# Patient Record
Sex: FEMALE | Race: WHITE | URBAN - METROPOLITAN AREA
[De-identification: names, ages, dates, MRNs, and addresses within clinical notes are randomized per-mention and may not be internally consistent; named-entity substitution may affect disease eponyms.]

---

## 2017-03-31 ENCOUNTER — THERAPY VISIT (OUTPATIENT)
Dept: PHYSICAL THERAPY | Facility: CLINIC | Age: 67
End: 2017-03-31
Payer: MEDICARE

## 2017-03-31 DIAGNOSIS — M54.2 NECK PAIN: Primary | ICD-10-CM

## 2017-03-31 PROCEDURE — 97112 NEUROMUSCULAR REEDUCATION: CPT | Mod: GP | Performed by: PHYSICAL THERAPIST

## 2017-03-31 PROCEDURE — G8982 BODY POS GOAL STATUS: HCPCS | Mod: GP | Performed by: PHYSICAL THERAPIST

## 2017-03-31 PROCEDURE — 97161 PT EVAL LOW COMPLEX 20 MIN: CPT | Mod: GP | Performed by: PHYSICAL THERAPIST

## 2017-03-31 PROCEDURE — G8981 BODY POS CURRENT STATUS: HCPCS | Mod: GP | Performed by: PHYSICAL THERAPIST

## 2017-03-31 PROCEDURE — 97110 THERAPEUTIC EXERCISES: CPT | Mod: GP | Performed by: PHYSICAL THERAPIST

## 2017-03-31 NOTE — MR AVS SNAPSHOT
After Visit Summary   3/31/2017    Berkley Hutchins    MRN: [id number]           Patient Information     Date Of Birth          1950        Visit Information        Provider Department      3/31/2017 9:00 AM Luiza Ponce, PT [facility]        Today's Diagnoses     Neck pain    -  1       Follow-ups after your visit        Your next 10 appointments already scheduled     Apr 07, 2017  3:30 PM CDT   KASSI Spine with Luiza Ponce PT   [facility] ([facility])    [address]  [facility] [address]   [phone]            Apr 14, 2017 11:00 AM CDT   KASSI Spine with Luiza Ponce PT   [facility] ([facility])    [address]  [facility] [address]   [phone]            Apr 19, 2017 10:10 AM CDT   KASSI Spine with Luiza Ponce PT   [facility] ([facility])    [address]  [facility] [id number] [phone]              Who to contact     If you have questions or need follow up information about today's clinic visit or your schedule please contact [facility] PHYS[facility] THERAP[facility] directly at [phone].  Normal or non-critical lab and imaging results will be communicated to you by MyChart, letter or phone within 4 business days after the clinic has received the results. If you do not hear from us within 7 days, please contact the clinic through MyChart or phone. If you have a critical or abnormal lab result, we will notify you by phone as soon as possible.  Submit refill requests through [facility] or call your pharmacy and they will forward the refill request to us. Please allow 3 business days for your refill to be completed.          Additional Information About Your Visit        MyChart Information      "viblast lets you send messages to your doctor, view your test results, renew your prescriptions, schedule appointments and more. To sign up, go to [email or web address] . Click on \"Log in\" on the left side of the screen, which will take you to the Welcome page. Then click on \"Sign up Now\" on the right side of the page.     You will be asked to enter the access code listed below, as well as some personal information. Please follow the directions to create your username and password.     Your access code is: [email or web address]  Expires: 2017 11:50 AM     Your access code will  in 90 days. If you need help or a new code, please call your [place] clinic or [phone].        Care EveryWhere ID     This is your Care EveryWhere ID. This could be used by other organizations to access your [place] medical records  [id number]         Blood Pressure from Last 3 Encounters:   No data found for BP    Weight from Last 3 Encounters:   No data found for Wt              We Performed the Following     HC PT EVAL, LOW COMPLEXITY     KASSI CERT REPORT     KASSI INITIAL EVAL REPORT     NEUROMUSCULAR RE-EDUCATION     THERAPEUTIC EXERCISES        Primary Care Provider    None Specified       No primary provider on file.        Thank you!     Thank you for choosing [facility]  for your care. Our goal is always to provide you with excellent care. Hearing back from our patients is one way we can continue to improve our services. Please take a few minutes to complete the written survey that you may receive in the mail after your visit with us. Thank you!             Your Updated Medication List - Protect others around you: Learn how to safely use, store and throw away your medicines at www.disposemymeds.org.      Notice  As of 3/31/2017 11:50 AM    You have not been prescribed any medications.      "

## 2017-03-31 NOTE — LETTER
DEPARTMENT OF HEALTH AND HUMAN SERVICES  CENTERS FOR MEDICARE & MEDICAID SERVICES    PLAN/UPDATED PLAN OF PROGRESS FOR OUTPATIENT REHABILITATION    PATIENTS NAME:  Berkley Hutchins     : 1950    PROVIDER NUMBER:    [id number]    [id number]     PROVIDER NAME: [facility]    MEDICAL RECORD NUMBER: [id number]     START OF CARE DATE:  SOC Date: 17   TYPE:  PT    PRIMARY/TREATMENT DIAGNOSIS: (Pertinent Medical Diagnosis)  Neck pain    VISITS FROM START OF CARE:  Rxs Used: 1     [facility] Initial Evaluation    Subjective:  Berkley Hutchins is a 66 year old female with a cervical spine condition.  Condition occurred with:  Insidious onset.  Condition occurred: for unknown reasons.  This is a recurrent and new condition  Berkley saw Dr. Cuello on 3/29/17 with c/o neck pain.  She was diagnosed with cervicalgia and was referred to PT for evaluation and treatment.  Berkley reports that had an episode of neck pain a few years back, better for past few years but started back up with unknown cause a couple of months ago.  Pt reports that she retired last November and she feels that she is more sedentary, ipad/reading. Chief complaints: pain/difficulty sleeping, intermittent HAs (affected by seasonal allergies), pain with turning head to L in car.    Patient reports pain: Cervical left side, upper cervical spine, mid cervical spine and lower cervical spine. Radiates to: top of L shoulder (UT) Pain is described as aching and sharp and is constant and reported as 5/10. Associated symptoms: Loss of motion/stiffness and headache. Pain is worse in the A.M. (depends on day/activity).  Symptoms are exacerbated by rotating head, driving, sitting, certain positions, lying down, carrying, lifting and looking up or down and relieved by heat (self massage).  Since onset symptoms are gradually worsening. Pertinent medical history includes:  High blood  pressure, rheumatoid arthritis and overweight.  Medical allergies: no. Other surgeries include: Other (, D&C, eyes, tonsils). Current medications:  High blood pressure medication. Current occupation is retired. Primary job tasks include:  Prolonged sitting and repetitive tasks (reading). Barriers include:  None as reported by the patient. Red flags:  None as reported by the patient.                      PATIENTS NAME:  Berkley Hutchins   : 1950    Objective:  Standing Alignment:    Cervical/Thoracic:  Forward head  Shoulder/UE:  Rounded shoulders    [facility]ervical Evaluation  Posture:  Sitting: poor  Standing: fair  Protruding Head: yes  Wry Neck: no  Correction of Posture: better  Other Observations: retraction with clinician OP centralized pain to neck  Movement Loss:  Protrusion (PRO): nil  Flexion (Flex): min  Retraction (RET): min  Extension (EXT): mod  Lateral Flexion Right (LF R): major and pain  Lateral Flexion Left (LF L): major and pain  Rotation Right (ROT R): mod  Rotation Left (ROT L): mod and pain  Test Movements:  RET: During: no effect  After: no effect  Mechanical Response: no effect  Repeat RET: During: no effect  After: no effect  Mechanical Response: IncROM  Conclusion: derangement  Principle of Treatment:  Posture Correction: neutral spine with sitting, consistent use of lumbar roll  Other: repeated cervical retraction with self OP every 2-3 hours      Assessment/Plan:    Patient is a 66 year old female with cervical complaints.    Patient has the following significant findings with corresponding treatment plan.                Diagnosis 1:  Neck pain  Pain -  hot/cold therapy, manual therapy, self management, education, directional preference exercise and home program  Decreased ROM/flexibility - manual therapy, therapeutic exercise and home program  Decreased joint mobility - manual therapy, therapeutic exercise and home program  Decreased strength - therapeutic exercise,  therapeutic activities and home program  Impaired muscle performance - neuro re-education and home program  Decreased function - therapeutic activities and home program  Impaired posture - neuro re-education and home program    Therapy Evaluation Codes:   1) History comprised of:   Personal factors that impact the plan of care:      None.    Comorbidity factors that impact the plan of care are:      None.    PATIENTS NAME:  Berkley Hutchins   : 1950     Medications impacting care: None.  2) Examination of Body Systems comprised of:   Body structures and functions that impact the plan of care:      Cervical spine.   Activity limitations that impact the plan of care are:      Driving, Reading/Computer work, Sitting and Sleeping.  3) Clinical presentation characteristics are:   Stable/Uncomplicated.  4) Decision-Making    Low complexity using standardized patient assessment instrument and/or measureable assessment of functional outcome.  Cumulative Therapy Evaluation is: Low complexity.  Previous and current functional limitations:  (See Goal Flow Sheet for this information)    Short term and Long term goals: (See Goal Flow Sheet for this information)   Communication ability:  Patient appears to be able to clearly communicate and understand verbal and written communication and follow directions correctly.  Treatment Explanation - The following has been discussed with the patient:   RX ordered/plan of care  Anticipated outcomes  Possible risks and side effects  This patient would benefit from PT intervention to resume normal activities.   Rehab potential is excellent.  Frequency:  1 X week, once daily  Duration:  for 8 weeks  Discharge Plan:  Achieve all LTG.  Independent in home treatment program.  Reach maximal therapeutic benefit.      Caregiver Signature/Credentials _____________________________ Date ________       Treating Provider: Luiza Ponce DPT   I have reviewed and certified the need for these services  "and plan of treatment while under my care.      PHYSICIAN'S SIGNATURE:   _________________________________________  Date___________   Jayden K Love    Certification period:  Beginning of Cert date period: 03/31/17 to  End of Cert period date: 06/28/17     Functional Level Progress Report: Please see attached \"Goal Flow sheet for Functional level.\"    ____X____ Continue Services or       ________ DC Services                Service dates: From  SOC Date: 03/31/17 date to present                         "

## 2017-03-31 NOTE — PROGRESS NOTES
[facility] Initial Evaluation    Subjective:    Berkley Hutchins is a 66 year old female with a cervical spine condition.  Condition occurred with:  Insidious onset.  Condition occurred: for unknown reasons.  This is a recurrent and new condition  Berkley saw Dr. Cuello on 3/29/17 with c/o neck pain.  She was diagnosed with cervicalgia and was referred to PT for evaluation and treatment.  Berkley reports that had an episode of neck pain a few years back, better for past few years but started back up with unknown cause a couple of months ago.  Pt reports that she retired last November and she feels that she is more sedentary, ipad/reading. Chief complaints: pain/difficulty sleeping, intermittent HAs (affected by seasonal allergies), pain with turning head to L in car.  .    Patient reports pain:  Cervical left side, upper cervical spine, mid cervical spine and lower cervical spine.  Radiates to: top of L shoulder (UT)  Pain is described as aching and sharp and is constant and reported as 5/10.  Associated symptoms:  Loss of motion/stiffness and headache. Pain is worse in the A.M. (depends on day/activity).  Symptoms are exacerbated by rotating head, driving, sitting, certain positions, lying down, carrying, lifting and looking up or down and relieved by heat (self massage).  Since onset symptoms are gradually worsening.          Pertinent medical history includes:  High blood pressure, rheumatoid arthritis and overweight.  Medical allergies: no.  Other surgeries include:  Other (, D&C, eyes, tonsils).  Current medications:  High blood pressure medication.  Current occupation is retired.    Primary job tasks include:  Prolonged sitting and repetitive tasks (reading).    Barriers include:  None as reported by the patient.    Red flags:  None as reported by the patient.                        Objective:    Standing Alignment:    Cervical/Thoracic:  Forward head  Shoulder/UE:  Rounded  shoulders                        Physical Exam    [place] Cervical Evaluation    Posture:  Sitting: poor  Standing: fair  Protruding Head: yes  Wry Neck: no  Correction of Posture: better  Other Observations: retraction with clinician OP centralized pain to neck  Movement Loss:  Protrusion (PRO): nil  Flexion (Flex): min  Retraction (RET): min  Extension (EXT): mod  Lateral Flexion Right (LF R): major and pain  Lateral Flexion Left (LF L): major and pain  Rotation Right (ROT R): mod  Rotation Left (ROT L): mod and pain  Test Movements:      RET: During: no effect  After: no effect  Mechanical Response: no effect  Repeat RET: During: no effect  After: no effect  Mechanical Response: IncROM                          Conclusion: derangement  Principle of Treatment:  Posture Correction: neutral spine with sitting, consistent use of lumbar roll        Other: repeated cervical retraction with self OP every 2-3 hours                                         ROS    Assessment/Plan:      Patient is a 66 year old female with cervical complaints.    Patient has the following significant findings with corresponding treatment plan.                Diagnosis 1:  Neck pain  Pain -  hot/cold therapy, manual therapy, self management, education, directional preference exercise and home program  Decreased ROM/flexibility - manual therapy, therapeutic exercise and home program  Decreased joint mobility - manual therapy, therapeutic exercise and home program  Decreased strength - therapeutic exercise, therapeutic activities and home program  Impaired muscle performance - neuro re-education and home program  Decreased function - therapeutic activities and home program  Impaired posture - neuro re-education and home program    Therapy Evaluation Codes:   1) History comprised of:   Personal factors that impact the plan of care:      None.    Comorbidity factors that impact the plan of care are:      None.     Medications impacting care:  None.  2) Examination of Body Systems comprised of:   Body structures and functions that impact the plan of care:      Cervical spine.   Activity limitations that impact the plan of care are:      Driving, Reading/Computer work, Sitting and Sleeping.  3) Clinical presentation characteristics are:   Stable/Uncomplicated.  4) Decision-Making    Low complexity using standardized patient assessment instrument and/or measureable assessment of functional outcome.  Cumulative Therapy Evaluation is: Low complexity.    Previous and current functional limitations:  (See Goal Flow Sheet for this information)    Short term and Long term goals: (See Goal Flow Sheet for this information)     Communication ability:  Patient appears to be able to clearly communicate and understand verbal and written communication and follow directions correctly.  Treatment Explanation - The following has been discussed with the patient:   RX ordered/plan of care  Anticipated outcomes  Possible risks and side effects  This patient would benefit from PT intervention to resume normal activities.   Rehab potential is excellent.    Frequency:  1 X week, once daily  Duration:  for 8 weeks  Discharge Plan:  Achieve all LTG.  Independent in home treatment program.  Reach maximal therapeutic benefit.    Please refer to the daily flowsheet for treatment today, total treatment time and time spent performing 1:1 timed codes.

## 2017-04-07 ENCOUNTER — THERAPY VISIT (OUTPATIENT)
Dept: PHYSICAL THERAPY | Facility: CLINIC | Age: 67
End: 2017-04-07
Payer: MEDICARE

## 2017-04-07 DIAGNOSIS — M54.2 NECK PAIN: ICD-10-CM

## 2017-04-07 PROCEDURE — 97112 NEUROMUSCULAR REEDUCATION: CPT | Mod: GP | Performed by: PHYSICAL THERAPIST

## 2017-04-07 PROCEDURE — 97110 THERAPEUTIC EXERCISES: CPT | Mod: GP | Performed by: PHYSICAL THERAPIST

## 2017-04-14 ENCOUNTER — THERAPY VISIT (OUTPATIENT)
Dept: PHYSICAL THERAPY | Facility: CLINIC | Age: 67
End: 2017-04-14
Payer: MEDICARE

## 2017-04-14 DIAGNOSIS — M54.2 NECK PAIN: ICD-10-CM

## 2017-04-14 PROCEDURE — 97112 NEUROMUSCULAR REEDUCATION: CPT | Mod: GP | Performed by: PHYSICAL THERAPIST

## 2017-04-14 PROCEDURE — 97110 THERAPEUTIC EXERCISES: CPT | Mod: GP | Performed by: PHYSICAL THERAPIST

## 2017-04-14 PROCEDURE — 97140 MANUAL THERAPY 1/> REGIONS: CPT | Mod: GP | Performed by: PHYSICAL THERAPIST

## 2017-04-19 ENCOUNTER — THERAPY VISIT (OUTPATIENT)
Dept: PHYSICAL THERAPY | Facility: CLINIC | Age: 67
End: 2017-04-19
Payer: MEDICARE

## 2017-04-19 DIAGNOSIS — M54.2 NECK PAIN: ICD-10-CM

## 2017-04-19 PROCEDURE — 97110 THERAPEUTIC EXERCISES: CPT | Mod: GP | Performed by: PHYSICAL THERAPIST

## 2017-04-19 PROCEDURE — 97140 MANUAL THERAPY 1/> REGIONS: CPT | Mod: GP | Performed by: PHYSICAL THERAPIST

## 2017-05-10 ENCOUNTER — THERAPY VISIT (OUTPATIENT)
Dept: PHYSICAL THERAPY | Facility: CLINIC | Age: 67
End: 2017-05-10
Payer: MEDICARE

## 2017-05-10 DIAGNOSIS — M54.2 NECK PAIN: ICD-10-CM

## 2017-05-10 PROCEDURE — 97110 THERAPEUTIC EXERCISES: CPT | Mod: GP | Performed by: PHYSICAL THERAPIST

## 2017-05-16 ENCOUNTER — THERAPY VISIT (OUTPATIENT)
Dept: PHYSICAL THERAPY | Facility: CLINIC | Age: 67
End: 2017-05-16
Payer: MEDICARE

## 2017-05-16 DIAGNOSIS — M54.2 NECK PAIN: ICD-10-CM

## 2017-05-16 PROCEDURE — 97112 NEUROMUSCULAR REEDUCATION: CPT | Mod: GP | Performed by: PHYSICAL THERAPIST

## 2017-05-16 PROCEDURE — 97110 THERAPEUTIC EXERCISES: CPT | Mod: GP | Performed by: PHYSICAL THERAPIST

## 2017-05-16 PROCEDURE — 97140 MANUAL THERAPY 1/> REGIONS: CPT | Mod: GP | Performed by: PHYSICAL THERAPIST

## 2017-05-31 ENCOUNTER — THERAPY VISIT (OUTPATIENT)
Dept: PHYSICAL THERAPY | Facility: CLINIC | Age: 67
End: 2017-05-31
Payer: MEDICARE

## 2017-05-31 DIAGNOSIS — M54.2 NECK PAIN: ICD-10-CM

## 2017-05-31 PROCEDURE — 97530 THERAPEUTIC ACTIVITIES: CPT | Mod: GP | Performed by: PHYSICAL THERAPIST

## 2017-05-31 PROCEDURE — 97110 THERAPEUTIC EXERCISES: CPT | Mod: GP | Performed by: PHYSICAL THERAPIST

## 2017-06-13 ENCOUNTER — THERAPY VISIT (OUTPATIENT)
Dept: PHYSICAL THERAPY | Facility: CLINIC | Age: 67
End: 2017-06-13
Payer: MEDICARE

## 2017-06-13 DIAGNOSIS — M54.2 NECK PAIN: ICD-10-CM

## 2017-06-13 PROCEDURE — 97112 NEUROMUSCULAR REEDUCATION: CPT | Mod: GP | Performed by: PHYSICAL THERAPIST

## 2017-06-13 PROCEDURE — G8982 BODY POS GOAL STATUS: HCPCS | Mod: GP | Performed by: PHYSICAL THERAPIST

## 2017-06-13 PROCEDURE — G8981 BODY POS CURRENT STATUS: HCPCS | Mod: GP | Performed by: PHYSICAL THERAPIST

## 2017-06-13 PROCEDURE — 97140 MANUAL THERAPY 1/> REGIONS: CPT | Mod: GP | Performed by: PHYSICAL THERAPIST

## 2017-06-13 PROCEDURE — 97110 THERAPEUTIC EXERCISES: CPT | Mod: GP | Performed by: PHYSICAL THERAPIST

## 2017-06-13 NOTE — PROGRESS NOTES
Subjective:    HPI                    Objective:    System    Physical Exam    General     ROS    Assessment/Plan:      PROGRESS  REPORT    Progress reporting period is from 3/31/17 to 6/13/17.       SUBJECTIVE  Subjective: Berkley reports that her neck has been feeling ok, noticed that hebert she does less exercise ain increases. Average 2/10 pain and depends on activity. Worst pain in the morning and gets better as the days goes on. Turning head to L always reproduces pain. Feels that she could be better at completing her exercises regularly (often falls off them when travelling).  Would like to continue PT as she notices true improvement when sticking to it.      Current Pain level: 2/10.     Initial Pain level: 5/10.   Changes in function:  Yes (See Goal flowsheet attached for changes in current functional level)  Adverse reaction to treatment or activity: None    OBJECTIVE  Changes noted in objective findings:  Yes, please see below.    Objective: Cervical AROM: flexion wnl + stretch in back, ext with lateral neck pain and mod loss, L SB with min loss, R SB with sig loss, L rotation 50, R rotation 60.  MDT force progression from repeated retraction to retraction with self OP at wall or clinician OP following by retraction/extension decrease and centralize pain to L lateral neck.  We discussed at length that Berkley must alter her ipad ergonomics in order to avoid prolonged cervical flexion (use music stand, pillows or other support).  At this time, Berkley would benefit from continued PT d/t continued variablility of symptoms and pain.  She has shown significant improvement since start of PT and excellent increase in ROM over past 2 weeks, however pain remains a low level.       ASSESSMENT/PLAN  Updated problem list and treatment plan: Diagnosis 1:  Neck pain  Pain -  hot/cold therapy, mechanical traction, manual therapy, self management, education, directional preference exercise and home program  Decreased  ROM/flexibility - manual therapy, therapeutic exercise and home program  Decreased joint mobility - manual therapy, therapeutic exercise and home program  Impaired muscle performance - neuro re-education and home program  Decreased function - therapeutic activities and home program  Impaired posture - neuro re-education  STG/LTGs have been met or progress has been made towards goals:  Yes (See Goal flow sheet completed today.)  Assessment of Progress: The patient's condition is improving.  Patient is meeting short term goals and is progressing towards long term goals.  Self Management Plans:  Patient has been instructed in a home treatment program.  Patient  has been instructed in self management of symptoms.  I have re-evaluated this patient and find that the nature, scope, duration and intensity of the therapy is appropriate for the medical condition of the patient.  Berkley continues to require the following intervention to meet STG and LTG's:  PT    Recommendations:  This patient would benefit from continued therapy.     Frequency:  2 X a month, once daily  Duration:  for 2 months        Please refer to the daily flowsheet for treatment today, total treatment time and time spent performing 1:1 timed codes.

## 2017-06-13 NOTE — LETTER
DEPARTMENT OF HEALTH AND HUMAN SERVICES  CENTERS FOR MEDICARE & MEDICAID SERVICES    PLAN/UPDATED PLAN OF PROGRESS FOR OUTPATIENT REHABILITATION    PATIENTS NAME:  Berkley Hutchins   : 1950  PROVIDER NUMBER:    [id number]  [facility]  [facility]AP[facility]  MEDICAL RECORD NUMBER: [id number]   START OF CARE DATE:  SOC Date: 17   TYPE:  PT  PRIMARY/TREATMENT DIAGNOSIS: (Pertinent Medical Diagnosis)  Neck pain  VISITS FROM START OF CARE:  Rxs Used: 8         PROGRESS  REPORT  Progress reporting period is from 3/31/17 to 17.     SUBJECTIVE  Subjective: Berkley reports that her neck has been feeling ok, noticed that hebert she does less exercise ain increases. Average 2/10 pain and depends on activity. Worst pain in the morning and gets better as the days goes on. Turning head to L always reproduces pain. Feels that she could be better at completing her exercises regularly (often falls off them when travelling).  Would like to continue PT as she notices true improvement when sticking to it.      Current Pain level: 2/10.     Initial Pain level: 5/10.   Changes in function:  Yes (See Goal flowsheet attached for changes in current functional level)  Adverse reaction to treatment or activity: None  OBJECTIVE  Changes noted in objective findings:  Yes, please see below.    Objective: Cervical AROM: flexion wnl + stretch in back, ext with lateral neck pain and mod loss, L SB with min loss, R SB with sig loss, L rotation 50, R rotation 60.  MDT force progression from repeated retraction to retraction with self OP at wall or clinician OP following by retraction/extension decrease and centralize pain to L lateral neck.  We discussed at length that Berkley must alter her ipad ergonomics in order to avoid prolonged cervical flexion (use music stand, pillows or other support).  At this time, Berkley would benefit from continued PT d/t continued variablility  of symptoms and pain.  She has shown significant improvement since start of PT and excellent increase in ROM over past 2 weeks, however pain remains a low level.     ASSESSMENT/PLAN  Updated problem list and treatment plan: Diagnosis 1:  Neck pain  Pain -  hot/cold therapy, mechanical traction, manual therapy, self management, education, directional preference exercise and home program  Decreased ROM/flexibility - manual therapy, therapeutic exercise and home program  Decreased joint mobility - manual therapy, therapeutic exercise and home program  Impaired muscle performance - neuro re-education and home program  Decreased function - therapeutic activities and home program  Impaired posture - neuro re-education  STG/LTGs have been met or progress has been made towards goals:  Yes (See Goal flow sheet completed today.)  Assessment of Progress: The patient's condition is improving.  Patient is meeting short term goals and is progressing towards long term goals.  Self Management Plans:  Patient has been instructed in a home treatment program.  Patient  has been instructed in self management of symptoms.  I have re-evaluated this patient and find that the nature, scope, duration and intensity of the therapy is appropriate for the medical condition of the patient.  Berkley continues to require the following intervention to meet STG and LTG's:  PT    Recommendations:  This patient would benefit from continued therapy.     Frequency:  2 X a month, once daily  Duration:  for 2 months      Caregiver Signature/Credentials _____________________________ Date ________       Treating Provider: Luiza Ponce DPT   I have reviewed and certified the need for these services and plan of treatment while under my care.        PHYSICIAN'S SIGNATURE:   _________________________________________  Date___________   Jayden Cuello    Certification period:  Beginning of Cert date period: 06/13/17 to  End of Cert period date: 09/10/17     Functional  "Level Progress Report: Please see attached \"Goal Flow sheet for Functional level.\"    ____X____ Continue Services or       ________ DC Services                Service dates: From  SOC Date: 03/31/17 date to present                         "

## 2017-06-13 NOTE — MR AVS SNAPSHOT
"              After Visit Summary   6/13/2017    Berkley Hutchins    MRN: [id number]           Patient Information     Date Of Birth          1950        Visit Information        Provider Department      6/13/2017 2:10 PM Luiza Ponce, PT [facility]        Today's Diagnoses     Neck pain           Follow-ups after your visit        Your next 10 appointments already scheduled     Jun 27, 2017  9:40 AM CDT   KASSI Spine with Luiza Ponce PT   [facility] ([facility])    [address]  [facility] [id number] [phone]            Jul 11, 2017 10:20 AM CDT   KASSI Spine with Luiza Ponce PT   [facility] ([facility])    [address]  [facility] [id number] [phone]              Who to contact     If you have questions or need follow up information about today's clinic visit or your schedule please contact [facility] directly at [phone].  Normal or non-critical lab and imaging results will be communicated to you by Videofropperhart, letter or phone within 4 business days after the clinic has received the results. If you do not hear from us within 7 days, please contact the clinic through [email or web address] or phone. If you have a critical or abnormal lab result, we will notify you by phone as soon as possible.  Submit refill requests through MiracleCord or call your pharmacy and they will forward the refill request to us. Please allow 3 business days for your refill to be completed.          Additional Information About Your Visit        MyChart Information     MiracleCord lets you send messages to your doctor, view your test results, renew your prescriptions, schedule appointments and more. To sign up, go to [email or web address] . Click on \"Log in\" on the left side of the screen, which " "will take you to the Welcome page. Then click on \"Sign up Now\" on the right side of the page.     You will be asked to enter the access code listed below, as well as some personal information. Please follow the directions to create your username and password.     Your access code is: [email or web address]  Expires: 2017 11:50 AM     Your access code will  in 90 days. If you need help or a new code, please call your [place] clinic or [phone].        Care EveryWhere ID     This is your Care EveryWhere ID. This could be used by other organizations to access your [place] medical records  [id number]         Blood Pressure from Last 3 Encounters:   No data found for BP    Weight from Last 3 Encounters:   No data found for Wt              We Performed the Following     KASSI PROGRESS NOTES REPORT     KASSI RE-CERT REPORT     MANUAL THER TECH,1+REGIONS,EA 15 MIN     NEUROMUSCULAR RE-EDUCATION     THERAPEUTIC EXERCISES        Primary Care Provider    None Specified       No primary provider on file.        Thank you!     Thank you for choosing [place] [facility] PHYS[facility] THERAP[facility]  for your care. Our goal is always to provide you with excellent care. Hearing back from our patients is one way we can continue to improve our services. Please take a few minutes to complete the written survey that you may receive in the mail after your visit with us. Thank you!             Your Updated Medication List - Protect others around you: Learn how to safely use, store and throw away your medicines at www.disposemymeds.org.      Notice  As of 2017  3:29 PM    You have not been prescribed any medications.      "

## 2017-06-13 NOTE — LETTER
[facility] PHYSICAL THERAPY  [address]  [place] [id number] [phone]    2017    Re: Berkley Hutchins   :   1950  MRN:  [id number]   REFERRING PHYSICIAN:   Jayden Cuello    [facility] PHYSICAL [facility]    Date of Initial Evaluation:  2017  Visits:  Rxs Used: 8  Reason for Referral:  Neck pain    EVALUATION SUMMARY      PROGRESS  REPORT    Progress reporting period is from 3/31/17 to 17.       SUBJECTIVE  Subjective: Berkley reports that her neck has been feeling ok, noticed that hebert she does less exercise ain increases. Average 2/10 pain and depends on activity. Worst pain in the morning and gets better as the days goes on. Turning head to L always reproduces pain. Feels that she could be better at completing her exercises regularly (often falls off them when travelling).  Would like to continue PT as she notices true improvement when sticking to it. Current Pain level: 2/10. Initial Pain level: 5/10. Changes in function:  Yes (See Goal flowsheet attached for changes in current functional level)  Adverse reaction to treatment or activity: None    OBJECTIVE  Changes noted in objective findings:  Yes, please see below.    Objective: Cervical AROM: flexion wnl + stretch in back, ext with lateral neck pain and mod loss, L SB with min loss, R SB with sig loss, L rotation 50, R rotation 60.  MDT force progression from repeated retraction to retraction with self OP at wall or clinician OP following by retraction/extension decrease and centralize pain to L lateral neck.  We discussed at length that Berkley must alter her ipad ergonomics in order to avoid prolonged cervical flexion (use music stand, pillows or other support).  At this time, Berkley would benefit from continued PT d/t continued variablility of symptoms and pain.  She has shown significant improvement since start of PT and excellent increase in  ROM over past 2 weeks, however pain remains a low level.       ASSESSMENT/PLAN  Updated problem list and treatment plan: Diagnosis 1:  Neck pain  Pain -  hot/cold therapy, mechanical traction, manual therapy, self management, education, directional    Re: Berkley Hutchins   :   1950  Page:  2       preference exercise and home program  Decreased ROM/flexibility - manual therapy, therapeutic exercise and home program  Decreased joint mobility - manual therapy, therapeutic exercise and home program  Impaired muscle performance - neuro re-education and home program  Decreased function - therapeutic activities and home program  Impaired posture - neuro re-education  STG/LTGs have been met or progress has been made towards goals:  Yes (See Goal flow sheet completed today.)  Assessment of Progress: The patient's condition is improving.  Patient is meeting short term goals and is progressing towards long term goals.  Self Management Plans:  Patient has been instructed in a home treatment program.  Patient  has been instructed in self management of symptoms.  I have re-evaluated this patient and find that the nature, scope, duration and intensity of the therapy is appropriate for the medical condition of the patient.  Berkley continues to require the following intervention to meet STG and LTG's:  PT    Recommendations:  This patient would benefit from continued therapy.     Frequency:  2 X a month, once daily  Duration:  for 2 months      Thank you for your referral.    INQUIRIES  Therapist: Luiza Ponce, PT  [facility]ER[facility]  [address] [place] [address]. [address]  [facility] [address]  Phone: [phone]  Fax: [phone]

## 2017-07-11 ENCOUNTER — THERAPY VISIT (OUTPATIENT)
Dept: PHYSICAL THERAPY | Facility: CLINIC | Age: 67
End: 2017-07-11
Payer: MEDICARE

## 2017-07-11 DIAGNOSIS — M54.2 NECK PAIN: ICD-10-CM

## 2017-07-11 PROCEDURE — 97140 MANUAL THERAPY 1/> REGIONS: CPT | Mod: GP | Performed by: PHYSICAL THERAPIST

## 2017-07-11 PROCEDURE — 97110 THERAPEUTIC EXERCISES: CPT | Mod: GP | Performed by: PHYSICAL THERAPIST

## 2017-08-01 ENCOUNTER — THERAPY VISIT (OUTPATIENT)
Dept: PHYSICAL THERAPY | Facility: CLINIC | Age: 67
End: 2017-08-01
Payer: MEDICARE

## 2017-08-01 DIAGNOSIS — M54.2 NECK PAIN: ICD-10-CM

## 2017-08-01 PROCEDURE — 97140 MANUAL THERAPY 1/> REGIONS: CPT | Mod: GP | Performed by: PHYSICAL THERAPIST

## 2017-08-01 PROCEDURE — 97110 THERAPEUTIC EXERCISES: CPT | Mod: GP | Performed by: PHYSICAL THERAPIST

## 2017-09-05 ENCOUNTER — THERAPY VISIT (OUTPATIENT)
Dept: PHYSICAL THERAPY | Facility: CLINIC | Age: 67
End: 2017-09-05
Payer: MEDICARE

## 2017-09-05 DIAGNOSIS — M54.2 NECK PAIN: ICD-10-CM

## 2017-09-05 PROCEDURE — 97112 NEUROMUSCULAR REEDUCATION: CPT | Mod: GP | Performed by: PHYSICAL THERAPIST

## 2017-09-05 PROCEDURE — 97110 THERAPEUTIC EXERCISES: CPT | Mod: GP | Performed by: PHYSICAL THERAPIST

## 2017-09-05 NOTE — LETTER
[facility] ATHLETIC [facility] PHYSICAL THERAPY  [address]  [place] [id number] [phone]    2017    Re: Berkley Hutchins   :   1950  MRN:  [id number]   REFERRING PHYSICIAN:   Jayden Cuello    [facility]TH[facility]IC [facility] PHYSICAL [facility]    Date of Initial Evaluation:  2017  Visits:  Rxs Used: 11  Reason for Referral:  Neck pain    DISCHARGE REPORT    Progress reporting period is from 17 to 17.       SUBJECTIVE  Subjective: Berkley reports that the past month has gone well with only slight increase in pain [place].  Not much pain with driving, No trouble sleeping as long as she has right pillow arrangement.  Doing her exercises as needed on avg 3x/day but not every day. Feels that she is ready to progress on her own after today.      Current Pain level: 1/10.     Initial Pain level: 5/10.   Changes in function:  Yes, please see above.   Adverse reaction to treatment or activity: None    OBJECTIVE  Changes noted in objective findings:  Yes, please see below.   Objective: Cervical AROM: flex wnl and painfree, ext with mod loss and painfree, B SB with mod loss and opp stretch in UT, R rot 70 with ERP only, L rot 65 and ERP.  5/5 B UE myotome strength C1-C7.  Pain only in lateral neck, no longer in top of shoulder.  Initial increase in pain with repeated retraction with self OP, however reduced with 2nd set.  Progression to retraction/ext centralizaed pain to focal area on L lateral neck in upper cervical region.  Further reduction in pain (trace remaining) following ret with clinician OP.  Emphasis given to consistency with posture of scap ret/depress and cervical retraction, use of lumbar roll.  Berkley has been instructed to continue her exercises on a regular basis (daily) and has been instructed to return to PT should she experience an exacerbation in pain or should her progress  ean.  She has 1 visit remaining on her original order but will progress independently after today as able.       ASSESSMENT/PLAN  Updated problem list and treatment plan: Diagnosis 1:  Neck pain  Pain -  home program  Decreased ROM/flexibility - home program  Impaired muscle performance - home program  Impaired posture - home program  STG/LTGs have been met or progress has been made towards goals:  Yes (See Goal flow sheet completed today.)  Assessment of Progress: The patient's condition is improving.  The patient has met all of their long term goals.  Self Management Plans:  Patient has been instructed in a home treatment program.  Patient  has been instructed in self management of symptoms.  I have re-evaluated this patient and find that the nature, scope, duration and intensity of the therapy is appropriate for the medical condition of the patient.  Berkley continues to require the following intervention to meet STG and LTG's:  PT intervention is no longer required to meet STG/LTG.    Recommendations:  This patient is ready to be discharged from therapy and continue their home treatment program.    Thank you for your referral.    INQUIRIES  Therapist: Luiza Ponce, PT    [facility] PHYSICAL THERAPY  [address] [place] [address]. [address]  [facility] [address]  Phone: [phone]  Fax: [phone]

## 2017-09-05 NOTE — PROGRESS NOTES
Subjective:    HPI                    Objective:    System    Physical Exam    General     ROS    Assessment/Plan:      DISCHARGE REPORT    Progress reporting period is from 6/13/17 to 9/5/17.       SUBJECTIVE  Subjective: Berkley reports that the past month has gone well with only slight increase in pain [place].  Not much pain with driving, No trouble sleeping as long as she has right pillow arrangement.  Doing her exercises as needed on avg 3x/day but not every day. Feels that she is ready to progress on her own after today.      Current Pain level: 1/10.     Initial Pain level: 5/10.   Changes in function:  Yes, please see above.   Adverse reaction to treatment or activity: None    OBJECTIVE  Changes noted in objective findings:  Yes, please see below.   Objective: Cervical AROM: flex wnl and painfree, ext with mod loss and painfree, B SB with mod loss and opp stretch in UT, R rot 70 with ERP only, L rot 65 and ERP.  5/5 B UE myotome strength C1-C7.  Pain only in lateral neck, no longer in top of shoulder.  Initial increase in pain with repeated retraction with self OP, however reduced with 2nd set.  Progression to retraction/ext centralizaed pain to focal area on L lateral neck in upper cervical region.  Further reduction in pain (trace remaining) following ret with clinician OP.  Emphasis given to consistency with posture of scap ret/depress and cervical retraction, use of lumbar roll.  Berkley has been instructed to continue her exercises on a regular basis (daily) and has been instructed to return to PT should she experience an exacerbation in pain or should her progress plateau.  She has 1 visit remaining on her original order but will progress independently after today as able.       ASSESSMENT/PLAN  Updated problem list and treatment plan: Diagnosis 1:  Neck pain  Pain -  home program  Decreased ROM/flexibility - home program  Impaired muscle performance - home  program  Impaired posture - home program  STG/LTGs have been met or progress has been made towards goals:  Yes (See Goal flow sheet completed today.)  Assessment of Progress: The patient's condition is improving.  The patient has met all of their long term goals.  Self Management Plans:  Patient has been instructed in a home treatment program.  Patient  has been instructed in self management of symptoms.  I have re-evaluated this patient and find that the nature, scope, duration and intensity of the therapy is appropriate for the medical condition of the patient.  Berkley continues to require the following intervention to meet STG and LTG's:  PT intervention is no longer required to meet STG/LTG.    Recommendations:  This patient is ready to be discharged from therapy and continue their home treatment program.    Please refer to the daily flowsheet for treatment today, total treatment time and time spent performing 1:1 timed codes.

## 2017-09-05 NOTE — MR AVS SNAPSHOT
"              After Visit Summary   2017    Berkley Hutchins    MRN: [id number]           Patient Information     Date Of Birth          1950        Visit Information        Provider Department      2017 10:20 AM Luiza Ponce PT [facility]        Today's Diagnoses     Neck pain           Follow-ups after your visit        Who to contact     If you have questions or need follow up information about today's clinic visit or your schedule please contact [facility] ATHLETIC [facility] PHYSICAL [facility] directly at [phone].  Normal or non-critical lab and imaging results will be communicated to you by Aconexhart, letter or phone within 4 business days after the clinic has received the results. If you do not hear from us within 7 days, please contact the clinic through [employer] or phone. If you have a critical or abnormal lab result, we will notify you by phone as soon as possible.  Submit refill requests through deskwolf or call your pharmacy and they will forward the refill request to us. Please allow 3 business days for your refill to be completed.          Additional Information About Your Visit        MyChart Information     deskwolf lets you send messages to your doctor, view your test results, renew your prescriptions, schedule appointments and more. To sign up, go to [email or web address] . Click on \"Log in\" on the left side of the screen, which will take you to the Welcome page. Then click on \"Sign up Now\" on the right side of the page.     You will be asked to enter the access code listed below, as well as some personal information. Please follow the directions to create your username and password.     Your access code is: [email or web address]  Expires: 10/9/2017 12:56 PM     Your access code will  in 90 days. If you need help or a new code, please call your [place] clinic or [phone].        Care EveryWhere ID     This is your " Care EveryWhere ID. This could be used by other organizations to access your [place] medical records  [id number]         Blood Pressure from Last 3 Encounters:   No data found for BP    Weight from Last 3 Encounters:   No data found for Wt              We Performed the Following     KASSI PROGRESS NOTES REPORT     NEUROMUSCULAR RE-EDUCATION     THERAPEUTIC EXERCISES        Primary Care Provider    None Specified       No primary provider on file.        Equal Access to Services     [facility]: Hadii aad ku hadshawandao Soomaali, waaxda luqadaha, qaybta kaalmada leo, jazmine shannondamionana laura funez . So [facility] [phone].    ATENCIÓN: Si habla español, tiene a garcia disposición servicios gratuitos de asistencia lingüística. Llame al [phone].    We comply with applicable federal civil rights laws and [place] laws. We do not discriminate on the basis of race, color, national origin, age, disability sex, sexual orientation or gender identity.            Thank you!     Thank you for choosing [place] [facility] PHYSICAL THERAPY  for your care. Our goal is always to provide you with excellent care. Hearing back from our patients is one way we can continue to improve our services. Please take a few minutes to complete the written survey that you may receive in the mail after your visit with us. Thank you!             Your Updated Medication List - Protect others around you: Learn how to safely use, store and throw away your medicines at www.disposemymeds.org.      Notice  As of 9/5/2017 10:56 AM    You have not been prescribed any medications.

## 2019-12-11 ENCOUNTER — APPOINTMENT (OUTPATIENT)
Age: 69
Setting detail: DERMATOLOGY
End: 2019-12-11

## 2019-12-11 DIAGNOSIS — L57.0 ACTINIC KERATOSIS: ICD-10-CM

## 2019-12-11 DIAGNOSIS — L57.8 OTHER SKIN CHANGES DUE TO CHRONIC EXPOSURE TO NONIONIZING RADIATION: ICD-10-CM

## 2019-12-11 DIAGNOSIS — D22 MELANOCYTIC NEVI: ICD-10-CM

## 2019-12-11 DIAGNOSIS — L91.8 OTHER HYPERTROPHIC DISORDERS OF THE SKIN: ICD-10-CM

## 2019-12-11 DIAGNOSIS — L82.1 OTHER SEBORRHEIC KERATOSIS: ICD-10-CM

## 2019-12-11 DIAGNOSIS — L82.0 INFLAMED SEBORRHEIC KERATOSIS: ICD-10-CM

## 2019-12-11 PROBLEM — D22.5 MELANOCYTIC NEVI OF TRUNK: Status: ACTIVE | Noted: 2019-12-11

## 2019-12-11 PROCEDURE — OTHER SKIN TAG REMOVAL MULTI: OTHER

## 2019-12-11 PROCEDURE — OTHER COUNSELING: OTHER

## 2019-12-11 PROCEDURE — 11200 RMVL SKIN TAGS UP TO&INC 15: CPT | Mod: 59

## 2019-12-11 PROCEDURE — OTHER LIQUID NITROGEN: OTHER

## 2019-12-11 PROCEDURE — 99214 OFFICE O/P EST MOD 30 MIN: CPT | Mod: 25

## 2019-12-11 PROCEDURE — 17110 DESTRUCT B9 LESION 1-14: CPT

## 2019-12-11 PROCEDURE — 17000 DESTRUCT PREMALG LESION: CPT | Mod: 59

## 2019-12-11 ASSESSMENT — LOCATION DETAILED DESCRIPTION DERM
LOCATION DETAILED: LEFT INFERIOR LATERAL NECK
LOCATION DETAILED: INFERIOR THORACIC SPINE
LOCATION DETAILED: RIGHT INFERIOR CENTRAL MALAR CHEEK
LOCATION DETAILED: RIGHT LATERAL SUPERIOR CHEST
LOCATION DETAILED: RIGHT INFERIOR MEDIAL UPPER BACK
LOCATION DETAILED: LEFT PROXIMAL LATERAL CALF
LOCATION DETAILED: RIGHT MEDIAL UPPER BACK

## 2019-12-11 ASSESSMENT — LOCATION ZONE DERM
LOCATION ZONE: FACE
LOCATION ZONE: TRUNK
LOCATION ZONE: LEG
LOCATION ZONE: NECK

## 2019-12-11 ASSESSMENT — LOCATION SIMPLE DESCRIPTION DERM
LOCATION SIMPLE: RIGHT UPPER BACK
LOCATION SIMPLE: LEFT CALF
LOCATION SIMPLE: LEFT ANTERIOR NECK
LOCATION SIMPLE: CHEST
LOCATION SIMPLE: UPPER BACK
LOCATION SIMPLE: RIGHT CHEEK

## 2019-12-11 NOTE — PROCEDURE: SKIN TAG REMOVAL MULTI
Consent: Written consent obtained and the risks of skin tag removal was reviewed with the patient including but not limited to bleeding, pigmentary change, infection, pain, and remote possibility of scarring.
Include Z78.9 (Other Specified Conditions Influencing Health Status) As An Associated Diagnosis?: No
Medical Necessity Information: It is in your best interest to select a reason for this procedure from the list below. All of these items fulfill various CMS LCD requirements except the new and changing color options.
Detail Level: Detailed
Medical Necessity Clause: This procedure was medically necessary because the lesions that were treated were:
Total Number Of Lesions Treated: 1
Add Associated Diagnoses If Applicable When Selecting Medical Necessity: Yes

## 2019-12-11 NOTE — PROCEDURE: LIQUID NITROGEN
Number Of Freeze-Thaw Cycles: 1 freeze-thaw cycle
Render Note In Bullet Format When Appropriate: Yes
Add 52 Modifier (Optional): no
Detail Level: Simple
Post-Care Instructions: I reviewed with the patient in detail post-care instructions. Patient is to wear sunprotection, and avoid picking at any of the treated lesions. Pt may apply Vaseline to crusted or scabbing areas.
Consent: The patient's consent was obtained including but not limited to risks of crusting, scabbing, blistering, scarring, darker or lighter pigmentary change, recurrence, incomplete removal and infection.
Medical Necessity Information: It is in your best interest to select a reason for this procedure from the list below. All of these items fulfill various CMS LCD requirements except the new and changing color options.
Medical Necessity Clause: This procedure was medically necessary because the lesions that were treated were:
Duration Of Freeze Thaw-Cycle (Seconds): 5
Duration Of Freeze Thaw-Cycle (Seconds): 5-10

## 2020-12-30 ENCOUNTER — APPOINTMENT (OUTPATIENT)
Dept: URBAN - METROPOLITAN AREA CLINIC 259 | Age: 70
Setting detail: DERMATOLOGY
End: 2020-12-30

## 2020-12-30 DIAGNOSIS — L81.4 OTHER MELANIN HYPERPIGMENTATION: ICD-10-CM

## 2020-12-30 DIAGNOSIS — L57.8 OTHER SKIN CHANGES DUE TO CHRONIC EXPOSURE TO NONIONIZING RADIATION: ICD-10-CM

## 2020-12-30 DIAGNOSIS — D18.0 HEMANGIOMA: ICD-10-CM

## 2020-12-30 DIAGNOSIS — D22 MELANOCYTIC NEVI: ICD-10-CM

## 2020-12-30 DIAGNOSIS — L82.1 OTHER SEBORRHEIC KERATOSIS: ICD-10-CM

## 2020-12-30 DIAGNOSIS — L57.0 ACTINIC KERATOSIS: ICD-10-CM

## 2020-12-30 DIAGNOSIS — Z71.89 OTHER SPECIFIED COUNSELING: ICD-10-CM

## 2020-12-30 PROBLEM — D22.5 MELANOCYTIC NEVI OF TRUNK: Status: ACTIVE | Noted: 2020-12-30

## 2020-12-30 PROBLEM — D18.01 HEMANGIOMA OF SKIN AND SUBCUTANEOUS TISSUE: Status: ACTIVE | Noted: 2020-12-30

## 2020-12-30 PROCEDURE — OTHER COUNSELING: OTHER

## 2020-12-30 PROCEDURE — OTHER LIQUID NITROGEN: OTHER

## 2020-12-30 PROCEDURE — 99214 OFFICE O/P EST MOD 30 MIN: CPT | Mod: 25

## 2020-12-30 PROCEDURE — 17000 DESTRUCT PREMALG LESION: CPT

## 2020-12-30 ASSESSMENT — LOCATION SIMPLE DESCRIPTION DERM
LOCATION SIMPLE: LEFT UPPER BACK
LOCATION SIMPLE: RIGHT CHEEK
LOCATION SIMPLE: UPPER BACK
LOCATION SIMPLE: RIGHT UPPER BACK

## 2020-12-30 ASSESSMENT — LOCATION DETAILED DESCRIPTION DERM
LOCATION DETAILED: INFERIOR THORACIC SPINE
LOCATION DETAILED: LEFT INFERIOR MEDIAL UPPER BACK
LOCATION DETAILED: RIGHT SUPERIOR MEDIAL UPPER BACK
LOCATION DETAILED: LEFT MEDIAL UPPER BACK
LOCATION DETAILED: RIGHT INFERIOR CENTRAL MALAR CHEEK

## 2020-12-30 ASSESSMENT — LOCATION ZONE DERM
LOCATION ZONE: FACE
LOCATION ZONE: TRUNK

## 2020-12-30 NOTE — PROCEDURE: LIQUID NITROGEN
Number Of Freeze-Thaw Cycles: 1 freeze-thaw cycle
Duration Of Freeze Thaw-Cycle (Seconds): 5
Detail Level: Detailed
Post-Care Instructions: I reviewed with the patient in detail post-care instructions. Patient is to wear sunprotection, and avoid picking at any of the treated lesions. Pt may apply Vaseline to crusted or scabbing areas.
Render Note In Bullet Format When Appropriate: Yes
Consent: The patient's consent was obtained including but not limited to risks of crusting, scabbing, blistering, scarring, darker or lighter pigmentary change, recurrence, incomplete removal and infection.

## 2022-02-17 ENCOUNTER — APPOINTMENT (OUTPATIENT)
Dept: URBAN - METROPOLITAN AREA CLINIC 259 | Age: 72
Setting detail: DERMATOLOGY
End: 2022-02-17

## 2022-02-17 VITALS — RESPIRATION RATE: 16 BRPM | HEIGHT: 63 IN | WEIGHT: 179 LBS

## 2022-02-17 DIAGNOSIS — L57.8 OTHER SKIN CHANGES DUE TO CHRONIC EXPOSURE TO NONIONIZING RADIATION: ICD-10-CM

## 2022-02-17 DIAGNOSIS — D22 MELANOCYTIC NEVI: ICD-10-CM

## 2022-02-17 DIAGNOSIS — L40.0 PSORIASIS VULGARIS: ICD-10-CM

## 2022-02-17 DIAGNOSIS — L30.4 ERYTHEMA INTERTRIGO: ICD-10-CM

## 2022-02-17 DIAGNOSIS — Z71.89 OTHER SPECIFIED COUNSELING: ICD-10-CM

## 2022-02-17 DIAGNOSIS — L57.0 ACTINIC KERATOSIS: ICD-10-CM

## 2022-02-17 DIAGNOSIS — L82.1 OTHER SEBORRHEIC KERATOSIS: ICD-10-CM

## 2022-02-17 DIAGNOSIS — D18.0 HEMANGIOMA: ICD-10-CM

## 2022-02-17 DIAGNOSIS — L81.4 OTHER MELANIN HYPERPIGMENTATION: ICD-10-CM

## 2022-02-17 PROBLEM — D18.01 HEMANGIOMA OF SKIN AND SUBCUTANEOUS TISSUE: Status: ACTIVE | Noted: 2022-02-17

## 2022-02-17 PROBLEM — D22.5 MELANOCYTIC NEVI OF TRUNK: Status: ACTIVE | Noted: 2022-02-17

## 2022-02-17 PROCEDURE — 99214 OFFICE O/P EST MOD 30 MIN: CPT

## 2022-02-17 PROCEDURE — OTHER PRESCRIPTION: OTHER

## 2022-02-17 PROCEDURE — OTHER MIPS QUALITY: OTHER

## 2022-02-17 PROCEDURE — OTHER COUNSELING: OTHER

## 2022-02-17 RX ORDER — HYDROCORTISONE 25 MG/G
2.5% CREAM TOPICAL BID
Qty: 30 | Refills: 2 | Status: ERX | COMMUNITY
Start: 2022-02-17

## 2022-02-17 RX ORDER — BETAMETHASONE VALERATE 1 MG/G
0.1% CREAM TOPICAL BID
Qty: 15 | Refills: 2 | Status: ERX | COMMUNITY
Start: 2022-02-17

## 2022-02-17 RX ORDER — KETOCONAZOLE 20 MG/G
2% CREAM TOPICAL BID
Qty: 30 | Refills: 2 | Status: ERX | COMMUNITY
Start: 2022-02-17

## 2022-02-17 RX ORDER — HYDROCORTISONE 25 MG/G
2.5% CREAM TOPICAL BID
Qty: 30 | Refills: 2 | Status: CANCELLED | COMMUNITY
Start: 2022-02-17

## 2022-02-17 ASSESSMENT — LOCATION SIMPLE DESCRIPTION DERM
LOCATION SIMPLE: RIGHT BREAST
LOCATION SIMPLE: LEFT UPPER BACK
LOCATION SIMPLE: RIGHT UPPER BACK
LOCATION SIMPLE: LEFT PRETIBIAL REGION
LOCATION SIMPLE: LEFT BREAST
LOCATION SIMPLE: UPPER BACK

## 2022-02-17 ASSESSMENT — LOCATION DETAILED DESCRIPTION DERM
LOCATION DETAILED: RIGHT SUPERIOR MEDIAL UPPER BACK
LOCATION DETAILED: LEFT PROXIMAL PRETIBIAL REGION
LOCATION DETAILED: LEFT MEDIAL BREAST 6-7:00 REGION
LOCATION DETAILED: INFERIOR THORACIC SPINE
LOCATION DETAILED: RIGHT INFRAMAMMARY CREASE (INNER QUADRANT)
LOCATION DETAILED: LEFT MEDIAL UPPER BACK
LOCATION DETAILED: LEFT INFERIOR MEDIAL UPPER BACK

## 2022-02-17 ASSESSMENT — LOCATION ZONE DERM
LOCATION ZONE: LEG
LOCATION ZONE: TRUNK

## 2022-02-17 NOTE — PROCEDURE: COUNSELING
Detail Level: Generalized
Detail Level: Detailed
Patient Specific Counseling (Will Not Stick From Patient To Patient): **Hydrocortisone initially sent to wrong pharmacy, which is why it appears twice in chart. Rx canceled and resent.**
Patient Specific Counseling (Will Not Stick From Patient To Patient): **Patient to use Betamethasone Rx BID for 3 weeks then RTC for recheck. If not resolved may consider biopsy to r/o possible SCC.**

## 2022-03-05 ENCOUNTER — HEALTH MAINTENANCE LETTER (OUTPATIENT)
Age: 72
End: 2022-03-05

## 2022-03-10 ENCOUNTER — APPOINTMENT (OUTPATIENT)
Dept: URBAN - METROPOLITAN AREA CLINIC 259 | Age: 72
Setting detail: DERMATOLOGY
End: 2022-03-10

## 2022-03-10 DIAGNOSIS — Z71.89 OTHER SPECIFIED COUNSELING: ICD-10-CM

## 2022-03-10 DIAGNOSIS — L40.0 PSORIASIS VULGARIS: ICD-10-CM

## 2022-03-10 PROCEDURE — OTHER COUNSELING: OTHER

## 2022-03-10 PROCEDURE — OTHER PRESCRIPTION MEDICATION MANAGEMENT: OTHER

## 2022-03-10 PROCEDURE — OTHER MIPS QUALITY: OTHER

## 2022-03-10 PROCEDURE — 99213 OFFICE O/P EST LOW 20 MIN: CPT

## 2022-03-10 PROCEDURE — OTHER PRESCRIPTION: OTHER

## 2022-03-10 ASSESSMENT — LOCATION ZONE DERM
LOCATION ZONE: LEG
LOCATION ZONE: TRUNK

## 2022-03-10 ASSESSMENT — LOCATION SIMPLE DESCRIPTION DERM
LOCATION SIMPLE: LEFT PRETIBIAL REGION
LOCATION SIMPLE: LEFT UPPER BACK

## 2022-03-10 ASSESSMENT — LOCATION DETAILED DESCRIPTION DERM
LOCATION DETAILED: LEFT INFERIOR MEDIAL UPPER BACK
LOCATION DETAILED: LEFT PROXIMAL PRETIBIAL REGION

## 2022-03-10 NOTE — PROCEDURE: PRESCRIPTION MEDICATION MANAGEMENT
Detail Level: Zone
Render In Strict Bullet Format?: No
Plan: She will CCC the Betamethasone for 1 more week then stop.

## 2022-03-10 NOTE — PROCEDURE: COUNSELING
Detail Level: Generalized
Detail Level: Detailed
Patient Specific Counseling (Will Not Stick From Patient To Patient): **Patient to use Betamethasone Rx BID for 3 weeks then RTC for recheck. If not resolved may consider biopsy to r/o possible SCC.**

## 2022-05-05 ENCOUNTER — APPOINTMENT (OUTPATIENT)
Dept: URBAN - METROPOLITAN AREA CLINIC 259 | Age: 72
Setting detail: DERMATOLOGY
End: 2022-05-05

## 2022-05-05 DIAGNOSIS — L82.1 OTHER SEBORRHEIC KERATOSIS: ICD-10-CM

## 2022-05-05 DIAGNOSIS — L81.4 OTHER MELANIN HYPERPIGMENTATION: ICD-10-CM

## 2022-05-05 DIAGNOSIS — L57.8 OTHER SKIN CHANGES DUE TO CHRONIC EXPOSURE TO NONIONIZING RADIATION: ICD-10-CM

## 2022-05-05 PROBLEM — D48.5 NEOPLASM OF UNCERTAIN BEHAVIOR OF SKIN: Status: ACTIVE | Noted: 2022-05-05

## 2022-05-05 PROCEDURE — OTHER COUNSELING: OTHER

## 2022-05-05 PROCEDURE — 11102 TANGNTL BX SKIN SINGLE LES: CPT

## 2022-05-05 PROCEDURE — OTHER BIOPSY BY SHAVE METHOD: OTHER

## 2022-05-05 PROCEDURE — OTHER MIPS QUALITY: OTHER

## 2022-05-05 PROCEDURE — 99213 OFFICE O/P EST LOW 20 MIN: CPT | Mod: 25

## 2022-05-05 ASSESSMENT — LOCATION ZONE DERM: LOCATION ZONE: FACE

## 2022-05-05 ASSESSMENT — LOCATION DETAILED DESCRIPTION DERM
LOCATION DETAILED: LEFT CENTRAL EYEBROW
LOCATION DETAILED: LEFT INFERIOR MEDIAL FOREHEAD

## 2022-05-05 ASSESSMENT — LOCATION SIMPLE DESCRIPTION DERM
LOCATION SIMPLE: LEFT EYEBROW
LOCATION SIMPLE: LEFT FOREHEAD

## 2022-05-05 NOTE — PROCEDURE: MIPS QUALITY
Quality 110: Preventive Care And Screening: Influenza Immunization: Influenza Immunization Ordered or Recommended, but not Administered due to system reason
Quality 111:Pneumonia Vaccination Status For Older Adults: Pneumococcal Vaccination not Administered or Previously Received, Reason not Otherwise Specified
Quality 226: Preventive Care And Screening: Tobacco Use: Screening And Cessation Intervention: Patient screened for tobacco use and is an ex/non-smoker
Quality 130: Documentation Of Current Medications In The Medical Record: Current Medications Documented
Detail Level: Detailed
Quality 431: Preventive Care And Screening: Unhealthy Alcohol Use - Screening: Patient screened for unhealthy alcohol use using a single question and scores less than 2 times per year

## 2022-05-19 ENCOUNTER — APPOINTMENT (OUTPATIENT)
Dept: URBAN - METROPOLITAN AREA CLINIC 259 | Age: 72
Setting detail: DERMATOLOGY
End: 2022-05-19

## 2022-05-19 PROBLEM — D04.72 CARCINOMA IN SITU OF SKIN OF LEFT LOWER LIMB, INCLUDING HIP: Status: ACTIVE | Noted: 2022-05-19

## 2022-05-19 PROCEDURE — OTHER CURETTAGE AND DESTRUCTION: OTHER

## 2022-05-19 PROCEDURE — OTHER MIPS QUALITY: OTHER

## 2022-05-19 PROCEDURE — 17263 DSTRJ MAL LES T/A/L 2.1-3.0: CPT

## 2022-05-19 NOTE — PROCEDURE: CURETTAGE AND DESTRUCTION
Cautery Type: electrodesiccation
Add Ability To Document Additional Intralesional Injection: No
Detail Level: Detailed
What Was Performed First?: Curettage
Size Of Lesion After Curettage: 2.2
Final Size Statement: The size of the lesion after curettage was
Anesthesia Type: 1% lidocaine with epinephrine
Consent was obtained from the patient. The risks, benefits and alternatives to therapy were discussed in detail. Specifically, the risks of infection, scarring, bleeding, prolonged wound healing, nerve injury, incomplete removal, allergy to anesthesia and recurrence were addressed. Alternatives to ED&C, such as: surgical removal and XRT were also discussed.  Prior to the procedure, the treatment site was clearly identified and confirmed by the patient. All components of Universal Protocol/PAUSE Rule completed.
Bill As A Line Item Or As Units: Line Item
Number Of Curettages: 3
Total Volume (Ccs): 1
Post-Care Instructions: I reviewed with the patient in detail post-care instructions. Patient is to keep the area dry for 48 hours, and not to engage in any swimming until the area is healed. Should the patient develop any fevers, chills, bleeding, severe pain patient will contact the office immediately.
Additional Information: (Optional): The wound was cleaned, and a pressure dressing was applied.  The patient received detailed post-op instructions.

## 2022-05-19 NOTE — PROCEDURE: MIPS QUALITY
Quality 431: Preventive Care And Screening: Unhealthy Alcohol Use - Screening: Patient screened for unhealthy alcohol use using a single question and scores less than 2 times per year
Detail Level: Detailed
Quality 111:Pneumonia Vaccination Status For Older Adults: Pneumococcal Vaccination not Administered or Previously Received, Reason not Otherwise Specified
Quality 110: Preventive Care And Screening: Influenza Immunization: Influenza Immunization Ordered or Recommended, but not Administered due to system reason
Quality 130: Documentation Of Current Medications In The Medical Record: Current Medications Documented
Quality 226: Preventive Care And Screening: Tobacco Use: Screening And Cessation Intervention: Patient screened for tobacco use and is an ex/non-smoker

## 2022-10-25 ENCOUNTER — APPOINTMENT (OUTPATIENT)
Dept: URBAN - METROPOLITAN AREA CLINIC 257 | Age: 72
Setting detail: DERMATOLOGY
End: 2022-10-25

## 2022-10-25 DIAGNOSIS — L81.4 OTHER MELANIN HYPERPIGMENTATION: ICD-10-CM

## 2022-10-25 DIAGNOSIS — L82.1 OTHER SEBORRHEIC KERATOSIS: ICD-10-CM

## 2022-10-25 DIAGNOSIS — Z85.828 PERSONAL HISTORY OF OTHER MALIGNANT NEOPLASM OF SKIN: ICD-10-CM

## 2022-10-25 PROCEDURE — 99212 OFFICE O/P EST SF 10 MIN: CPT | Mod: 25

## 2022-10-25 PROCEDURE — 17110 DESTRUCT B9 LESION 1-14: CPT

## 2022-10-25 PROCEDURE — OTHER LIQUID NITROGEN: OTHER

## 2022-10-25 PROCEDURE — OTHER COUNSELING: OTHER

## 2022-10-25 ASSESSMENT — LOCATION DETAILED DESCRIPTION DERM
LOCATION DETAILED: LEFT INFERIOR FOREHEAD
LOCATION DETAILED: RIGHT INFERIOR CENTRAL MALAR CHEEK
LOCATION DETAILED: LEFT DISTAL PRETIBIAL REGION

## 2022-10-25 ASSESSMENT — LOCATION SIMPLE DESCRIPTION DERM
LOCATION SIMPLE: RIGHT CHEEK
LOCATION SIMPLE: LEFT PRETIBIAL REGION
LOCATION SIMPLE: LEFT FOREHEAD

## 2022-10-25 ASSESSMENT — LOCATION ZONE DERM
LOCATION ZONE: LEG
LOCATION ZONE: FACE

## 2022-10-25 NOTE — PROCEDURE: LIQUID NITROGEN
Medical Necessity Information: It is in your best interest to select a reason for this procedure from the list below. All of these items fulfill various CMS LCD requirements except the new and changing color options.
Consent: The patient's consent was obtained including but not limited to risks of crusting, scabbing, blistering, scarring, darker or lighter pigmentary change, recurrence, incomplete removal and infection.
Add 52 Modifier (Optional): no
Duration Of Freeze Thaw-Cycle (Seconds): 5-10
Detail Level: Detailed
Number Of Freeze-Thaw Cycles: 2 freeze-thaw cycles
Post-Care Instructions: I reviewed with the patient in detail post-care instructions. Patient is to wear sunprotection, and avoid picking at any of the treated lesions. Pt may apply Vaseline to crusted or scabbing areas.
Show Aperture Variable?: Yes
Spray Paint Text: The liquid nitrogen was applied to the skin utilizing a spray paint frosting technique.
Medical Necessity Clause: This procedure was medically necessary because the lesions that were treated were:

## 2022-11-19 ENCOUNTER — HEALTH MAINTENANCE LETTER (OUTPATIENT)
Age: 72
End: 2022-11-19

## 2022-12-07 ENCOUNTER — APPOINTMENT (OUTPATIENT)
Dept: URBAN - METROPOLITAN AREA CLINIC 259 | Age: 72
Setting detail: DERMATOLOGY
End: 2022-12-07

## 2022-12-07 DIAGNOSIS — L82.0 INFLAMED SEBORRHEIC KERATOSIS: ICD-10-CM

## 2022-12-07 DIAGNOSIS — L70.8 OTHER ACNE: ICD-10-CM

## 2022-12-07 PROCEDURE — OTHER EXTRACTIONS: OTHER

## 2022-12-07 PROCEDURE — OTHER DEFER: OTHER

## 2022-12-07 PROCEDURE — OTHER MIPS QUALITY: OTHER

## 2022-12-07 PROCEDURE — OTHER COUNSELING: OTHER

## 2022-12-07 PROCEDURE — 99213 OFFICE O/P EST LOW 20 MIN: CPT

## 2022-12-07 ASSESSMENT — LOCATION ZONE DERM
LOCATION ZONE: ARM
LOCATION ZONE: NECK

## 2022-12-07 ASSESSMENT — LOCATION SIMPLE DESCRIPTION DERM
LOCATION SIMPLE: RIGHT SHOULDER
LOCATION SIMPLE: LEFT ANTERIOR NECK
LOCATION SIMPLE: RIGHT ANTERIOR NECK

## 2022-12-07 ASSESSMENT — LOCATION DETAILED DESCRIPTION DERM
LOCATION DETAILED: RIGHT ANTERIOR SHOULDER
LOCATION DETAILED: RIGHT SUPERIOR ANTERIOR NECK
LOCATION DETAILED: LEFT INFERIOR ANTERIOR NECK

## 2022-12-07 NOTE — PROCEDURE: EXTRACTIONS
Consent was obtained and risks were reviewed including but not limited to scarring, infection, bleeding, scabbing, incomplete removal, and allergy to anesthesia.
Acne Type: A comedo
Render Number Of Lesions Treated: yes
Render Post-Care Instructions In Note?: no
Prep Text (Optional): Prior to removal the treatment areas were prepped in the usual fashion.
Extraction Method: physical extraction
Post-Care Instructions: I reviewed with the patient in detail post-care instructions. Patient is to keep the treatment areas dry overnight, and then apply bacitracin twice daily until healed. Patient may apply hydrogen peroxide soaks to remove any crusting.
Detail Level: Simple

## 2022-12-07 NOTE — HPI: SKIN LESION
What Type Of Note Output Would You Prefer (Optional)?: Standard Output
Has Your Skin Lesion Been Treated?: not been treated
Is This A New Presentation, Or A Follow-Up?: Skin Lesion
Which Family Member (Optional)?: Brother
Which Family Member (Optional)?: Father

## 2022-12-07 NOTE — PROCEDURE: DEFER
Introduction Text (Please End With A Colon): The following procedure was deferred:
Scheduling Instructions (Optional): 15 min
Detail Level: Detailed
Procedure To Be Performed At Next Visit: Cryotherapy
Size Of Lesion In Cm (Optional): 0

## 2023-03-06 ENCOUNTER — APPOINTMENT (OUTPATIENT)
Dept: URBAN - METROPOLITAN AREA CLINIC 259 | Age: 73
Setting detail: DERMATOLOGY
End: 2023-03-06

## 2023-03-06 DIAGNOSIS — L21.8 OTHER SEBORRHEIC DERMATITIS: ICD-10-CM

## 2023-03-06 PROCEDURE — OTHER PRESCRIPTION: OTHER

## 2023-03-06 PROCEDURE — OTHER COUNSELING: OTHER

## 2023-03-06 PROCEDURE — OTHER PRESCRIPTION MEDICATION MANAGEMENT: OTHER

## 2023-03-06 PROCEDURE — OTHER MIPS QUALITY: OTHER

## 2023-03-06 PROCEDURE — 99213 OFFICE O/P EST LOW 20 MIN: CPT

## 2023-03-06 RX ORDER — KETOCONAZOLE 20 MG/G
CREAM TOPICAL
Qty: 60 | Refills: 1 | Status: ERX

## 2023-03-06 RX ORDER — HYDROCORTISONE 25 MG/G
CREAM TOPICAL
Qty: 30 | Refills: 1 | Status: ERX

## 2023-03-06 ASSESSMENT — LOCATION SIMPLE DESCRIPTION DERM: LOCATION SIMPLE: GLABELLA

## 2023-03-06 ASSESSMENT — LOCATION DETAILED DESCRIPTION DERM: LOCATION DETAILED: GLABELLA

## 2023-03-06 ASSESSMENT — LOCATION ZONE DERM: LOCATION ZONE: FACE

## 2023-03-06 NOTE — PROCEDURE: PRESCRIPTION MEDICATION MANAGEMENT
Detail Level: Zone
Render In Strict Bullet Format?: No
Initiate Treatment: ketoconazole 2 % topical cream: Mix 1:1 with hydrocortisone, then apply to affected areas on face twice daily as needed\\nhydrocortisone 2.5 % topical cream: Mix 1:1 with ketoconazole, apply twice daily as needed to affected areas.

## 2023-04-15 ENCOUNTER — HEALTH MAINTENANCE LETTER (OUTPATIENT)
Age: 73
End: 2023-04-15

## 2023-12-12 ENCOUNTER — APPOINTMENT (OUTPATIENT)
Dept: URBAN - METROPOLITAN AREA CLINIC 259 | Age: 73
Setting detail: DERMATOLOGY
End: 2023-12-12

## 2023-12-12 DIAGNOSIS — L57.0 ACTINIC KERATOSIS: ICD-10-CM

## 2023-12-12 DIAGNOSIS — L20.89 OTHER ATOPIC DERMATITIS: ICD-10-CM

## 2023-12-12 DIAGNOSIS — L21.8 OTHER SEBORRHEIC DERMATITIS: ICD-10-CM

## 2023-12-12 DIAGNOSIS — Z85.828 PERSONAL HISTORY OF OTHER MALIGNANT NEOPLASM OF SKIN: ICD-10-CM

## 2023-12-12 PROCEDURE — OTHER PRESCRIPTION MEDICATION MANAGEMENT: OTHER

## 2023-12-12 PROCEDURE — 17000 DESTRUCT PREMALG LESION: CPT

## 2023-12-12 PROCEDURE — 99214 OFFICE O/P EST MOD 30 MIN: CPT | Mod: 25

## 2023-12-12 PROCEDURE — OTHER PRESCRIPTION: OTHER

## 2023-12-12 PROCEDURE — OTHER MIPS QUALITY: OTHER

## 2023-12-12 PROCEDURE — 17003 DESTRUCT PREMALG LES 2-14: CPT

## 2023-12-12 PROCEDURE — OTHER COUNSELING: OTHER

## 2023-12-12 PROCEDURE — OTHER LIQUID NITROGEN: OTHER

## 2023-12-12 RX ORDER — TRIAMCINOLONE ACETONIDE 1 MG/G
CREAM TOPICAL
Qty: 30 | Refills: 0 | Status: ERX | COMMUNITY
Start: 2023-12-12

## 2023-12-12 ASSESSMENT — LOCATION ZONE DERM
LOCATION ZONE: NOSE
LOCATION ZONE: HAND
LOCATION ZONE: LEG
LOCATION ZONE: FACE

## 2023-12-12 ASSESSMENT — LOCATION SIMPLE DESCRIPTION DERM
LOCATION SIMPLE: LEFT CHEEK
LOCATION SIMPLE: GLABELLA
LOCATION SIMPLE: RIGHT HAND
LOCATION SIMPLE: SUPERIOR FOREHEAD
LOCATION SIMPLE: RIGHT FOREHEAD
LOCATION SIMPLE: LEFT PRETIBIAL REGION
LOCATION SIMPLE: NOSE

## 2023-12-12 ASSESSMENT — LOCATION DETAILED DESCRIPTION DERM
LOCATION DETAILED: GLABELLA
LOCATION DETAILED: SUPERIOR MID FOREHEAD
LOCATION DETAILED: RIGHT SUPERIOR FOREHEAD
LOCATION DETAILED: LEFT PROXIMAL PRETIBIAL REGION
LOCATION DETAILED: RIGHT SUPERIOR MEDIAL FOREHEAD
LOCATION DETAILED: RIGHT THENAR EMINENCE
LOCATION DETAILED: NASAL DORSUM
LOCATION DETAILED: LEFT MEDIAL MALAR CHEEK

## 2023-12-12 NOTE — PROCEDURE: PRESCRIPTION MEDICATION MANAGEMENT
Detail Level: Zone
Render In Strict Bullet Format?: No
Continue Regimen: ketoconazole 2 % topical cream: Mix 1:1 with hydrocortisone, then apply to affected areas on face twice daily as needed\\nhydrocortisone 2.5 % topical cream: Mix 1:1 with ketoconazole, apply twice daily as needed to affected areas.
Initiate Treatment: Triamcinalone 0.1% cream BID for 2 weeks

## 2023-12-12 NOTE — HPI: SKIN LESION
What Type Of Note Output Would You Prefer (Optional)?: Standard Output
Has Your Skin Lesion Been Treated?: not been treated
Is This A New Presentation, Or A Follow-Up?: Skin Lesions
Additional History: The patient reports she has noticed new red, raised lesions on her face and nose. The lesions are not itchy or painful. \\n\\nShe also states that about a month ago or so, she fell and hit her right hand. Since then, her right palm has been red and itchy.
Which Family Member (Optional)?: Brother

## 2024-01-24 ENCOUNTER — APPOINTMENT (OUTPATIENT)
Dept: URBAN - METROPOLITAN AREA CLINIC 259 | Age: 74
Setting detail: DERMATOLOGY
End: 2024-01-25

## 2024-01-24 DIAGNOSIS — L57.0 ACTINIC KERATOSIS: ICD-10-CM

## 2024-01-24 DIAGNOSIS — L82.1 OTHER SEBORRHEIC KERATOSIS: ICD-10-CM

## 2024-01-24 PROCEDURE — OTHER MIPS QUALITY: OTHER

## 2024-01-24 PROCEDURE — OTHER LIQUID NITROGEN: OTHER

## 2024-01-24 PROCEDURE — OTHER COUNSELING: OTHER

## 2024-01-24 PROCEDURE — 17000 DESTRUCT PREMALG LESION: CPT

## 2024-01-24 PROCEDURE — 99212 OFFICE O/P EST SF 10 MIN: CPT | Mod: 25

## 2024-01-24 ASSESSMENT — LOCATION SIMPLE DESCRIPTION DERM
LOCATION SIMPLE: RIGHT CHEEK
LOCATION SIMPLE: LEFT FOREHEAD

## 2024-01-24 ASSESSMENT — LOCATION ZONE DERM: LOCATION ZONE: FACE

## 2024-01-24 ASSESSMENT — LOCATION DETAILED DESCRIPTION DERM
LOCATION DETAILED: LEFT INFERIOR FOREHEAD
LOCATION DETAILED: RIGHT CENTRAL MALAR CHEEK

## 2024-01-24 NOTE — HPI: SKIN LESION
What Type Of Note Output Would You Prefer (Optional)?: Standard Output
How Severe Is Your Skin Lesion?: mild
Has Your Skin Lesion Been Treated?: not been treated
Is This A New Presentation, Or A Follow-Up?: Skin Lesion
Additional History: Pt states she has a red spot on her right cheek that she would like checked today.
Which Family Member (Optional)?: Brother

## 2024-01-24 NOTE — PROCEDURE: LIQUID NITROGEN
Post-Care Instructions: I reviewed with the patient in detail post-care instructions. Patient is to wear sunprotection, and avoid picking at any of the treated lesions. Pt may apply Vaseline to crusted or scabbing areas.
Duration Of Freeze Thaw-Cycle (Seconds): 3
Render Note In Bullet Format When Appropriate: No
Consent: The patient's consent was obtained including but not limited to risks of crusting, scabbing, blistering, scarring, darker or lighter pigmentary change, recurrence, incomplete removal and infection.
Show Applicator Variable?: Yes
Number Of Freeze-Thaw Cycles: 3 freeze-thaw cycles
Detail Level: Detailed

## 2024-02-14 ENCOUNTER — APPOINTMENT (OUTPATIENT)
Dept: URBAN - METROPOLITAN AREA CLINIC 257 | Age: 74
Setting detail: DERMATOLOGY
End: 2024-02-15

## 2024-02-14 DIAGNOSIS — Z85.828 PERSONAL HISTORY OF OTHER MALIGNANT NEOPLASM OF SKIN: ICD-10-CM

## 2024-02-14 DIAGNOSIS — L57.0 ACTINIC KERATOSIS: ICD-10-CM

## 2024-02-14 PROCEDURE — OTHER LIQUID NITROGEN: OTHER

## 2024-02-14 PROCEDURE — OTHER COUNSELING: OTHER

## 2024-02-14 PROCEDURE — 17000 DESTRUCT PREMALG LESION: CPT

## 2024-02-14 PROCEDURE — 99212 OFFICE O/P EST SF 10 MIN: CPT | Mod: 25

## 2024-02-14 PROCEDURE — OTHER MIPS QUALITY: OTHER

## 2024-02-14 ASSESSMENT — LOCATION DETAILED DESCRIPTION DERM
LOCATION DETAILED: RIGHT CENTRAL MALAR CHEEK
LOCATION DETAILED: LEFT PROXIMAL PRETIBIAL REGION

## 2024-02-14 ASSESSMENT — LOCATION SIMPLE DESCRIPTION DERM
LOCATION SIMPLE: RIGHT CHEEK
LOCATION SIMPLE: LEFT PRETIBIAL REGION

## 2024-02-14 ASSESSMENT — LOCATION ZONE DERM
LOCATION ZONE: FACE
LOCATION ZONE: LEG

## 2025-01-28 NOTE — PROCEDURE: LIQUID NITROGEN
Provider E-Visit time total (minutes): 3      
Post-Care Instructions: I reviewed with the patient in detail post-care instructions. Patient is to wear sunprotection, and avoid picking at any of the treated lesions. Pt may apply Vaseline to crusted or scabbing areas.
Duration Of Freeze Thaw-Cycle (Seconds): 3
Render Note In Bullet Format When Appropriate: No
Consent: The patient's consent was obtained including but not limited to risks of crusting, scabbing, blistering, scarring, darker or lighter pigmentary change, recurrence, incomplete removal and infection.
Show Applicator Variable?: Yes
Number Of Freeze-Thaw Cycles: 3 freeze-thaw cycles
Detail Level: Detailed